# Patient Record
Sex: MALE | Race: OTHER | HISPANIC OR LATINO | URBAN - METROPOLITAN AREA
[De-identification: names, ages, dates, MRNs, and addresses within clinical notes are randomized per-mention and may not be internally consistent; named-entity substitution may affect disease eponyms.]

---

## 2018-10-26 ENCOUNTER — EMERGENCY (EMERGENCY)
Facility: HOSPITAL | Age: 48
LOS: 1 days | Discharge: OTHER INSTITUTION | End: 2018-10-26
Attending: EMERGENCY MEDICINE
Payer: COMMERCIAL

## 2018-10-26 VITALS — HEIGHT: 64 IN | WEIGHT: 190.04 LBS

## 2018-10-26 LAB
ALBUMIN SERPL ELPH-MCNC: 4 G/DL — SIGNIFICANT CHANGE UP (ref 3.3–5.2)
ALP SERPL-CCNC: 57 U/L — SIGNIFICANT CHANGE UP (ref 40–120)
ALT FLD-CCNC: 53 U/L — HIGH
ANION GAP SERPL CALC-SCNC: 12 MMOL/L — SIGNIFICANT CHANGE UP (ref 5–17)
APTT BLD: 24.9 SEC — LOW (ref 27.5–37.4)
AST SERPL-CCNC: 106 U/L — HIGH
BASOPHILS # BLD AUTO: 0 K/UL — SIGNIFICANT CHANGE UP (ref 0–0.2)
BASOPHILS NFR BLD AUTO: 0.2 % — SIGNIFICANT CHANGE UP (ref 0–2)
BILIRUB SERPL-MCNC: 0.9 MG/DL — SIGNIFICANT CHANGE UP (ref 0.4–2)
BUN SERPL-MCNC: 14 MG/DL — SIGNIFICANT CHANGE UP (ref 8–20)
CALCIUM SERPL-MCNC: 8.5 MG/DL — LOW (ref 8.6–10.2)
CHLORIDE SERPL-SCNC: 98 MMOL/L — SIGNIFICANT CHANGE UP (ref 98–107)
CO2 SERPL-SCNC: 28 MMOL/L — SIGNIFICANT CHANGE UP (ref 22–29)
CREAT SERPL-MCNC: 0.59 MG/DL — SIGNIFICANT CHANGE UP (ref 0.5–1.3)
EOSINOPHIL # BLD AUTO: 0.3 K/UL — SIGNIFICANT CHANGE UP (ref 0–0.5)
EOSINOPHIL NFR BLD AUTO: 6 % — HIGH (ref 0–5)
ETHANOL SERPL-MCNC: <10 MG/DL — SIGNIFICANT CHANGE UP
GLUCOSE SERPL-MCNC: 113 MG/DL — SIGNIFICANT CHANGE UP (ref 70–115)
HCT VFR BLD CALC: 39.5 % — LOW (ref 42–52)
HGB BLD-MCNC: 14.2 G/DL — SIGNIFICANT CHANGE UP (ref 14–18)
INR BLD: 1.12 RATIO — SIGNIFICANT CHANGE UP (ref 0.88–1.16)
LIDOCAIN IGE QN: 47 U/L — SIGNIFICANT CHANGE UP (ref 22–51)
LYMPHOCYTES # BLD AUTO: 1.2 K/UL — SIGNIFICANT CHANGE UP (ref 1–4.8)
LYMPHOCYTES # BLD AUTO: 21.9 % — SIGNIFICANT CHANGE UP (ref 20–55)
MAGNESIUM SERPL-MCNC: 2.2 MG/DL — SIGNIFICANT CHANGE UP (ref 1.6–2.6)
MCHC RBC-ENTMCNC: 30.7 PG — SIGNIFICANT CHANGE UP (ref 27–31)
MCHC RBC-ENTMCNC: 35.9 G/DL — SIGNIFICANT CHANGE UP (ref 32–36)
MCV RBC AUTO: 85.3 FL — SIGNIFICANT CHANGE UP (ref 80–94)
MONOCYTES # BLD AUTO: 0.4 K/UL — SIGNIFICANT CHANGE UP (ref 0–0.8)
MONOCYTES NFR BLD AUTO: 7.3 % — SIGNIFICANT CHANGE UP (ref 3–10)
NEUTROPHILS # BLD AUTO: 3.5 K/UL — SIGNIFICANT CHANGE UP (ref 1.8–8)
NEUTROPHILS NFR BLD AUTO: 64.4 % — SIGNIFICANT CHANGE UP (ref 37–73)
PLATELET # BLD AUTO: 106 K/UL — LOW (ref 150–400)
POTASSIUM SERPL-MCNC: 3.9 MMOL/L — SIGNIFICANT CHANGE UP (ref 3.5–5.3)
POTASSIUM SERPL-SCNC: 3.9 MMOL/L — SIGNIFICANT CHANGE UP (ref 3.5–5.3)
PROT SERPL-MCNC: 6.6 G/DL — SIGNIFICANT CHANGE UP (ref 6.6–8.7)
PROTHROM AB SERPL-ACNC: 12.3 SEC — SIGNIFICANT CHANGE UP (ref 9.8–12.7)
RBC # BLD: 4.63 M/UL — SIGNIFICANT CHANGE UP (ref 4.6–6.2)
RBC # FLD: 12.5 % — SIGNIFICANT CHANGE UP (ref 11–15.6)
SODIUM SERPL-SCNC: 138 MMOL/L — SIGNIFICANT CHANGE UP (ref 135–145)
T4 AB SER-ACNC: 8.4 UG/DL — SIGNIFICANT CHANGE UP (ref 4.5–12)
TROPONIN T SERPL-MCNC: <0.01 NG/ML — SIGNIFICANT CHANGE UP (ref 0–0.06)
TSH SERPL-MCNC: 6.17 UIU/ML — HIGH (ref 0.27–4.2)
WBC # BLD: 5.5 K/UL — SIGNIFICANT CHANGE UP (ref 4.8–10.8)
WBC # FLD AUTO: 5.5 K/UL — SIGNIFICANT CHANGE UP (ref 4.8–10.8)

## 2018-10-26 PROCEDURE — 99285 EMERGENCY DEPT VISIT HI MDM: CPT

## 2018-10-26 PROCEDURE — 93010 ELECTROCARDIOGRAM REPORT: CPT

## 2018-10-26 RX ORDER — SODIUM CHLORIDE 9 MG/ML
1000 INJECTION INTRAMUSCULAR; INTRAVENOUS; SUBCUTANEOUS
Qty: 0 | Refills: 0 | Status: DISCONTINUED | OUTPATIENT
Start: 2018-10-26 | End: 2018-10-31

## 2018-10-26 RX ORDER — FOLIC ACID 0.8 MG
1 TABLET ORAL ONCE
Qty: 0 | Refills: 0 | Status: COMPLETED | OUTPATIENT
Start: 2018-10-26 | End: 2018-10-26

## 2018-10-26 RX ORDER — THIAMINE MONONITRATE (VIT B1) 100 MG
100 TABLET ORAL ONCE
Qty: 0 | Refills: 0 | Status: COMPLETED | OUTPATIENT
Start: 2018-10-26 | End: 2018-10-26

## 2018-10-26 RX ORDER — METOCLOPRAMIDE HCL 10 MG
10 TABLET ORAL ONCE
Qty: 0 | Refills: 0 | Status: COMPLETED | OUTPATIENT
Start: 2018-10-26 | End: 2018-10-26

## 2018-10-26 RX ADMIN — SODIUM CHLORIDE 200 MILLILITER(S): 9 INJECTION INTRAMUSCULAR; INTRAVENOUS; SUBCUTANEOUS at 19:20

## 2018-10-26 RX ADMIN — Medication 1 MILLIGRAM(S): at 19:21

## 2018-10-26 RX ADMIN — Medication 10 MILLIGRAM(S): at 19:21

## 2018-10-26 RX ADMIN — Medication 30 MILLILITER(S): at 19:21

## 2018-10-26 RX ADMIN — Medication 100 MILLIGRAM(S): at 19:21

## 2018-10-26 NOTE — ED ADULT NURSE NOTE - CAS DISCH CONDITION
Pt nervous, just called his family. B/P slightly elevated. gait stead, ambulated to bathroom. Offers no complaints of pain at this time./Improved

## 2018-10-26 NOTE — ED PROVIDER NOTE - OBJECTIVE STATEMENT
47 y/o M pt with hx of anxiety, gastritis, and EtOH abuse presents to ED c/o hospitalize becky withfrawl before past seven days has been drinking daily last fdrink was this morning 1 beer prior sober for 7 years abd pain nausea vomiting dirahhea tremos vomiting is non blood no bilious 1 episode of cp at n oon atoday which resolveed spontaneoulsy no aleviating factors that he noticed no urinay sx no fevers no chills no SI no LI no visual or auditory hallucinations  tremors onset . Pt states that he vomited. NKDA  Pt denies fevers/chills, ha, loc, focal neuro deficits, cp/sob/palp, cough, abd pain/n/d, urinary symptoms, recent travel and sick contacts. No further complaints at this time. 47 y/o M pt with hx of anxiety, gastritis, and EtOH abuse presents to ED c/o tremors. He has been hospitalized for withdrawal before. The past seven days he has been drinking, with his last drink being 1 beer earlier this morning. He has been sober for the past seven years. Pt has abdominal pain, nausea, vomiting, diarrhea, and tremors. Vomiting does not contain blood nor bilious. Pt states he had 1 episode of CP at       noon today, which resolved spontaneously. There are no alleviating factors that he noticed. NKDA  Pt denies fevers/chills, ha, focal neuro deficits, sob/palp, cough, urinary symptoms, SI or HI, visual or auditory hallucinations, recent travel and sick contacts. No further complaints at this time.

## 2018-10-26 NOTE — ED ADULT NURSE NOTE - OBJECTIVE STATEMENT
pt states that he has been drinking everyday and takes clonazepam for anxiety prn pt states that he took his meds today and then felt tremulus. pt also c/o headache states that he only had 1 beer today

## 2018-10-26 NOTE — ED ADULT TRIAGE NOTE - CHIEF COMPLAINT QUOTE
c/o drinks everyday and now has abd. pain and feels shaky, last drink this morning c/o drinks everyday and now has abd. pain and feels shaky, last drink this morning, wants detox after he is discharged

## 2018-10-26 NOTE — ED STATDOCS - PROGRESS NOTE DETAILS
Patient is a 48 year old M with PMHx of gastritis and daily EtOH drinker who presents to the ED complaining of withdrawal symptoms.  States that he quit drinking and started drinking again, last drink this am.  Notes nausea, generalized abdominal pain, emesis prior to arrival. Focused eval, protocol orders entered. Pt to be moved to main ED for complete evaluation by another provider.  Exam: tongue appears dry, hands are not tremulous

## 2018-10-26 NOTE — ED PROVIDER NOTE - MEDICAL DECISION MAKING DETAILS
47 y/o M pt with hx of anxiety, gastritis, and EtOH abuse presents to ED c/o tremors. 47 y/o M pt with hx of anxiety, gastritis, and EtOH abuse presents to ED c/o tremors - labs within normal mild withdrawal at this time given librium - pt requesting BH eval - cleared medically for eval

## 2018-10-26 NOTE — ED ADULT NURSE NOTE - CHIEF COMPLAINT QUOTE
c/o drinks everyday and now has abd. pain and feels shaky, last drink this morning, wants detox after he is discharged

## 2018-10-26 NOTE — ED PROVIDER NOTE - PROGRESS NOTE DETAILS
Spoke with Dr. Motta from telepsych to marva as per psychiatry pt to be transferred to [facility]

## 2018-10-27 VITALS
SYSTOLIC BLOOD PRESSURE: 157 MMHG | HEART RATE: 66 BPM | TEMPERATURE: 98 F | OXYGEN SATURATION: 100 % | DIASTOLIC BLOOD PRESSURE: 98 MMHG | RESPIRATION RATE: 18 BRPM

## 2018-10-27 DIAGNOSIS — F10.20 ALCOHOL DEPENDENCE, UNCOMPLICATED: ICD-10-CM

## 2018-10-27 LAB
AMPHET UR-MCNC: NEGATIVE — SIGNIFICANT CHANGE UP
APPEARANCE UR: CLEAR — SIGNIFICANT CHANGE UP
BACTERIA # UR AUTO: NEGATIVE — SIGNIFICANT CHANGE UP
BARBITURATES UR SCN-MCNC: NEGATIVE — SIGNIFICANT CHANGE UP
BENZODIAZ UR-MCNC: NEGATIVE — SIGNIFICANT CHANGE UP
BILIRUB UR-MCNC: NEGATIVE — SIGNIFICANT CHANGE UP
COCAINE METAB.OTHER UR-MCNC: NEGATIVE — SIGNIFICANT CHANGE UP
COLOR SPEC: YELLOW — SIGNIFICANT CHANGE UP
DIFF PNL FLD: NEGATIVE — SIGNIFICANT CHANGE UP
EPI CELLS # UR: SIGNIFICANT CHANGE UP
GLUCOSE UR QL: NEGATIVE MG/DL — SIGNIFICANT CHANGE UP
KETONES UR-MCNC: NEGATIVE — SIGNIFICANT CHANGE UP
LEUKOCYTE ESTERASE UR-ACNC: NEGATIVE — SIGNIFICANT CHANGE UP
METHADONE UR-MCNC: NEGATIVE — SIGNIFICANT CHANGE UP
NITRITE UR-MCNC: NEGATIVE — SIGNIFICANT CHANGE UP
OPIATES UR-MCNC: NEGATIVE — SIGNIFICANT CHANGE UP
PCP SPEC-MCNC: SIGNIFICANT CHANGE UP
PCP UR-MCNC: NEGATIVE — SIGNIFICANT CHANGE UP
PH UR: 9 — HIGH (ref 5–8)
PROT UR-MCNC: NEGATIVE MG/DL — SIGNIFICANT CHANGE UP
RBC CASTS # UR COMP ASSIST: NEGATIVE /HPF — SIGNIFICANT CHANGE UP (ref 0–4)
SP GR SPEC: 1.01 — SIGNIFICANT CHANGE UP (ref 1.01–1.02)
THC UR QL: NEGATIVE — SIGNIFICANT CHANGE UP
UROBILINOGEN FLD QL: NEGATIVE MG/DL — SIGNIFICANT CHANGE UP
WBC UR QL: SIGNIFICANT CHANGE UP

## 2018-10-27 PROCEDURE — 84484 ASSAY OF TROPONIN QUANT: CPT

## 2018-10-27 PROCEDURE — 84436 ASSAY OF TOTAL THYROXINE: CPT

## 2018-10-27 PROCEDURE — 83690 ASSAY OF LIPASE: CPT

## 2018-10-27 PROCEDURE — T1013: CPT

## 2018-10-27 PROCEDURE — 80307 DRUG TEST PRSMV CHEM ANLYZR: CPT

## 2018-10-27 PROCEDURE — 96375 TX/PRO/DX INJ NEW DRUG ADDON: CPT

## 2018-10-27 PROCEDURE — 36415 COLL VENOUS BLD VENIPUNCTURE: CPT

## 2018-10-27 PROCEDURE — 96361 HYDRATE IV INFUSION ADD-ON: CPT

## 2018-10-27 PROCEDURE — 80053 COMPREHEN METABOLIC PANEL: CPT

## 2018-10-27 PROCEDURE — 90792 PSYCH DIAG EVAL W/MED SRVCS: CPT | Mod: GT

## 2018-10-27 PROCEDURE — 85730 THROMBOPLASTIN TIME PARTIAL: CPT

## 2018-10-27 PROCEDURE — 85610 PROTHROMBIN TIME: CPT

## 2018-10-27 PROCEDURE — 85027 COMPLETE CBC AUTOMATED: CPT

## 2018-10-27 PROCEDURE — 81001 URINALYSIS AUTO W/SCOPE: CPT

## 2018-10-27 PROCEDURE — 99285 EMERGENCY DEPT VISIT HI MDM: CPT | Mod: 25

## 2018-10-27 PROCEDURE — 84443 ASSAY THYROID STIM HORMONE: CPT

## 2018-10-27 PROCEDURE — 83735 ASSAY OF MAGNESIUM: CPT

## 2018-10-27 PROCEDURE — 96374 THER/PROPH/DIAG INJ IV PUSH: CPT

## 2018-10-27 PROCEDURE — 93005 ELECTROCARDIOGRAM TRACING: CPT

## 2018-10-27 RX ORDER — IBUPROFEN 200 MG
600 TABLET ORAL ONCE
Qty: 0 | Refills: 0 | Status: COMPLETED | OUTPATIENT
Start: 2018-10-27 | End: 2018-10-27

## 2018-10-27 RX ORDER — ONDANSETRON 8 MG/1
4 TABLET, FILM COATED ORAL ONCE
Qty: 0 | Refills: 0 | Status: COMPLETED | OUTPATIENT
Start: 2018-10-27 | End: 2018-10-27

## 2018-10-27 RX ORDER — ONDANSETRON 8 MG/1
4 TABLET, FILM COATED ORAL ONCE
Qty: 0 | Refills: 0 | Status: DISCONTINUED | OUTPATIENT
Start: 2018-10-27 | End: 2018-10-27

## 2018-10-27 RX ADMIN — Medication 600 MILLIGRAM(S): at 05:19

## 2018-10-27 RX ADMIN — Medication 600 MILLIGRAM(S): at 15:05

## 2018-10-27 RX ADMIN — Medication 600 MILLIGRAM(S): at 07:00

## 2018-10-27 RX ADMIN — Medication 50 MILLIGRAM(S): at 02:39

## 2018-10-27 RX ADMIN — Medication 600 MILLIGRAM(S): at 11:20

## 2018-10-27 RX ADMIN — SODIUM CHLORIDE 1000 MILLILITER(S): 9 INJECTION INTRAMUSCULAR; INTRAVENOUS; SUBCUTANEOUS at 02:30

## 2018-10-27 RX ADMIN — Medication 600 MILLIGRAM(S): at 16:43

## 2018-10-27 RX ADMIN — Medication 30 MILLILITER(S): at 13:30

## 2018-10-27 RX ADMIN — ONDANSETRON 4 MILLIGRAM(S): 8 TABLET, FILM COATED ORAL at 06:04

## 2018-10-27 NOTE — ED ADULT NURSE REASSESSMENT NOTE - NS ED NURSE REASSESS COMMENT FT1
pt assessed by tele-psych and tolerated well. pt states he takes klonopin prn for chronic intermittent "nerve pain" to left ear s/p a work related injury in which he incurred foreign material in the ear.

## 2018-10-27 NOTE — ED BEHAVIORAL HEALTH ASSESSMENT NOTE - SAFETY PLAN DETAILS
Pt understands that should he want further treatment of his anxiety and EtOH use, he can come back for referrals

## 2018-10-27 NOTE — ED BEHAVIORAL HEALTH NOTE - BEHAVIORAL HEALTH NOTE
RYAN NOTE:  SW spoke with pt with  #[id number] regarding detox. Pt is in agreement to go to Detox. SW explained and answered all questions regarding detox. Pt's sister in consult room speaking with RN; both SW and RN met with pt's sister and pt who explained what he wants to do. Pt's information faxed to [facility] in regards to an available detox bed. RYAN following

## 2018-10-27 NOTE — ED ADULT NURSE REASSESSMENT NOTE - NS ED NURSE REASSESS COMMENT FT1
received pt awake alert and oriented x4, pt medically cleared by ed attending Hernan, pt c/o nausea and vomiting prior to coming to the hospital, pt states he was "drinking beer and alcohol for four days, pt states "I want to get the alcohol out of my body" pt requesting etoh rehab services, pt states he last drank 36 hours ago and has been experiencing withdrawal symptoms. CIWA score in  of 7, pt denies any past or current psychiatric problems, denies any inpatient tx for etoh or psych in the past, pt denies SI, HI, AVH or paranoia.

## 2018-10-27 NOTE — ED BEHAVIORAL HEALTH ASSESSMENT NOTE - RISK ASSESSMENT
He is at chronically elevated risk of danger to himself given his relapse on EtOH, however his risk of imminent danger to himself is low based on his current mental status exam; does not require a more restrictive setting of care.

## 2018-10-27 NOTE — ED BEHAVIORAL HEALTH ASSESSMENT NOTE - HPI (INCLUDE ILLNESS QUALITY, SEVERITY, DURATION, TIMING, CONTEXT, MODIFYING FACTORS, ASSOCIATED SIGNS AND SYMPTOMS)
47 yo  M, Malay speaking only, employed as a ,  from his wife, non-caregiver with a history of anxiety, alcohol use disorder, no prior hospitalizations, no prior suicide attempts or self-injurious behaviors, no history of violence or arrests, active ETOH use and PMH of gastritis presented to the ED initially with a friend with complaints of physical discomfort and increased anxiety; in the context of relapsing back onto heavy EtOH use for the past week after a period of sobriety for 5 years. Pt is preoccupied with the nausea and abdominal discomfort. Pt reports that he has been anxious but denies being depressed. Denies SI / NSSI urges. Pt denies AVH / PI / IOR. No evidence of acute psychosis or steve on exam. Pt reports the primary problem is that he's drinking more, and mixing liquor with beer which has made him feel very sick. Pt states that his primary goal is to get alcohol out of his system so that he can feel better again. Reports that his last drink was on Wednesday.     Obtained collateral from his sister Tatiana: At baseline, patient is single, legally  from wife and living with sister for many years. He is employed as a  full time and in recovery from alcohol for 5 years.  Per sister, patient has appeared more tired than usual stating that his mood is “normal” without mood symptoms, anxiety, psychosis or thoughts to harm self/others. She has observed him drinking alcohol daily for about 1 week (exact amount unknown) without any other known drug use. She is unaware of any triggering events or stressors and has expressed concern for his relapse. Patient has had no prior hospitalizations and no prior suicide attempts or self-injurious behaviors. Per sister, he has never seen a therapist or psychiatrist and is not on any current medications. He has no known legal history or +hx of violence.  Sister denies acute safety concerns for suicide and feels patient would most benefit from detox treatment.

## 2018-10-27 NOTE — ED BEHAVIORAL HEALTH ASSESSMENT NOTE - SUMMARY
49 yo  M, Georgian speaking only, employed as a ,  from his wife, non-caregiver with a history of anxiety, alcohol use disorder, no prior hospitalizations, no prior suicide attempts or self-injurious behaviors, no history of violence or arrests, active ETOH use and PMH of gastritis presented to the ED initially with a friend with complaints of physical discomfort and increased anxiety; in the context of relapsing back onto heavy EtOH use for the past week after a period of sobriety for 5 years. Currently, he is preoccupied with physical discomfort, while appearing motivated to overcome his issues with EtOH use, denying any SI / NSSI urges or depression. Impressions of his overall presentation is due to relapsing on EtOH with some mild EtOH withdrawal. Denies needing any referrals for EtOH abuse, as he feels like he'll be able to stop drinking on his own. Discussed with patient possible interventions. Pt is agreeable to receiving medical interventions for his EtOH use and withdrawal. Denies any need for psychiatric treatment itself EtOH rehab. Pt will benefit from medical optimization of his current clinical situation with EtOH use and withdrawal. Pt is psychiatrically cleared.

## 2018-10-27 NOTE — ED BEHAVIORAL HEALTH ASSESSMENT NOTE - DESCRIPTION
Pt in ED for ~9 hrs. prior to Telepsych consult. Per nursing staff, patient presented to ED at 10/26/18 at 17:06 appropriately dressed and groomed with good hygiene.  He was accompanied by a friend who remained at bedside for a short while and is no longer present. Patient was compliant with triage process including gown changing, checking of belongings, labs and vitals. He presented with chief complaint of ETOH abuse and anxiety and has remained calm and cooperative throughout ED stay. He did not display any agitation or aggressive behaviors or require use of restraints/prn. Per nurse, patient is resting quietly and waiting for telepsych assessment. Mood is anxious and he denies SI/HI or AH/VH. Patient is alert and oriented x4. gastritis lives with his sister,

## 2018-10-27 NOTE — ED ADULT NURSE REASSESSMENT NOTE - CONDITION
improved/Pt asleep on initial rounds, V/S 136/79 P 65 R 20 T 98.1 Po2 98% Pt reports some nausea but improved, minor tremors felt. No diaphoresis. Pt reports heavy drinking last 4 days after 13 years sobriety. Denies hx of seizures. Up for breakfast. requested and given  decalf coffee.. Denies S/I, H/I or any psychosis.

## 2018-10-27 NOTE — ED ADULT NURSE REASSESSMENT NOTE - NS ED NURSE REASSESS COMMENT FT1
pt po challenged and tolerated intake well, pt ciwa 4, awaiting s/w for possible placement for etoh rehab.

## 2018-10-27 NOTE — ED BEHAVIORAL HEALTH NOTE - BEHAVIORAL HEALTH NOTE
RYANNotprimo: kobe Sandoval pt accepted for detox program at [facility]. Black: kobe Sandoval pt accepted for detox program at [facility] by dr Stahl. [facility] called (ROCAEL Morales within 60-90 min. Worker informed dr Ratliff,verbalized understanding. No other concerns reported at this moment.

## 2018-10-27 NOTE — ED BEHAVIORAL HEALTH ASSESSMENT NOTE - REFERRAL / APPOINTMENT DETAILS
Pt declined need for substance abuse referrals; just wants to feel better and have EtOH out of his system

## 2018-10-27 NOTE — ED BEHAVIORAL HEALTH NOTE - BEHAVIORAL HEALTH NOTE
RYAN Note: RYAN spoke to Lori at [facility] - detox bed available. Clinicals sent over and is currently being reviewed. SW to follow.

## 2020-09-08 NOTE — ED ADULT NURSE NOTE - CAS EDN INTEG ASSESS
Pharmacy faxed over a request for patient's Lisinopril for a 90 day supply it is more cost effective for patient.   Sending to provider for approval.
WDL

## 2022-03-16 ENCOUNTER — EMERGENCY (EMERGENCY)
Facility: HOSPITAL | Age: 52
LOS: 1 days | Discharge: HOME | End: 2022-03-16
Attending: EMERGENCY MEDICINE
Payer: COMMERCIAL

## 2022-03-16 VITALS
RESPIRATION RATE: 20 BRPM | DIASTOLIC BLOOD PRESSURE: 106 MMHG | HEIGHT: 64 IN | OXYGEN SATURATION: 99 % | TEMPERATURE: 98 F | SYSTOLIC BLOOD PRESSURE: 149 MMHG | WEIGHT: 181 LBS | HEART RATE: 81 BPM

## 2022-03-16 VITALS
DIASTOLIC BLOOD PRESSURE: 98 MMHG | SYSTOLIC BLOOD PRESSURE: 149 MMHG | HEART RATE: 85 BPM | RESPIRATION RATE: 18 BRPM | OXYGEN SATURATION: 99 % | TEMPERATURE: 99 F

## 2022-03-16 PROBLEM — F41.9 ANXIETY DISORDER, UNSPECIFIED: Chronic | Status: ACTIVE | Noted: 2018-10-26

## 2022-03-16 PROBLEM — F10.10 ALCOHOL ABUSE, UNCOMPLICATED: Chronic | Status: ACTIVE | Noted: 2018-10-26

## 2022-03-16 LAB
ALBUMIN SERPL ELPH-MCNC: 4.3 G/DL — SIGNIFICANT CHANGE UP (ref 3.3–5.2)
ALP SERPL-CCNC: 66 U/L — SIGNIFICANT CHANGE UP (ref 40–120)
ALT FLD-CCNC: 56 U/L — HIGH
ANION GAP SERPL CALC-SCNC: 14 MMOL/L — SIGNIFICANT CHANGE UP (ref 5–17)
AST SERPL-CCNC: 45 U/L — HIGH
BASOPHILS # BLD AUTO: 0.04 K/UL — SIGNIFICANT CHANGE UP (ref 0–0.2)
BASOPHILS NFR BLD AUTO: 0.6 % — SIGNIFICANT CHANGE UP (ref 0–2)
BILIRUB SERPL-MCNC: 0.7 MG/DL — SIGNIFICANT CHANGE UP (ref 0.4–2)
BUN SERPL-MCNC: 15.1 MG/DL — SIGNIFICANT CHANGE UP (ref 8–20)
CALCIUM SERPL-MCNC: 8.7 MG/DL — SIGNIFICANT CHANGE UP (ref 8.6–10.2)
CHLORIDE SERPL-SCNC: 96 MMOL/L — LOW (ref 98–107)
CO2 SERPL-SCNC: 25 MMOL/L — SIGNIFICANT CHANGE UP (ref 22–29)
CREAT SERPL-MCNC: 0.86 MG/DL — SIGNIFICANT CHANGE UP (ref 0.5–1.3)
EGFR: 105 ML/MIN/1.73M2 — SIGNIFICANT CHANGE UP
EOSINOPHIL # BLD AUTO: 0.19 K/UL — SIGNIFICANT CHANGE UP (ref 0–0.5)
EOSINOPHIL NFR BLD AUTO: 2.9 % — SIGNIFICANT CHANGE UP (ref 0–6)
GLUCOSE SERPL-MCNC: 115 MG/DL — HIGH (ref 70–99)
HCT VFR BLD CALC: 43.2 % — SIGNIFICANT CHANGE UP (ref 39–50)
HGB BLD-MCNC: 15.6 G/DL — SIGNIFICANT CHANGE UP (ref 13–17)
IMM GRANULOCYTES NFR BLD AUTO: 0.6 % — SIGNIFICANT CHANGE UP (ref 0–1.5)
LIDOCAIN IGE QN: 27 U/L — SIGNIFICANT CHANGE UP (ref 22–51)
LYMPHOCYTES # BLD AUTO: 0.84 K/UL — LOW (ref 1–3.3)
LYMPHOCYTES # BLD AUTO: 12.9 % — LOW (ref 13–44)
MCHC RBC-ENTMCNC: 30.5 PG — SIGNIFICANT CHANGE UP (ref 27–34)
MCHC RBC-ENTMCNC: 36.1 GM/DL — HIGH (ref 32–36)
MCV RBC AUTO: 84.5 FL — SIGNIFICANT CHANGE UP (ref 80–100)
MONOCYTES # BLD AUTO: 0.48 K/UL — SIGNIFICANT CHANGE UP (ref 0–0.9)
MONOCYTES NFR BLD AUTO: 7.4 % — SIGNIFICANT CHANGE UP (ref 2–14)
NEUTROPHILS # BLD AUTO: 4.94 K/UL — SIGNIFICANT CHANGE UP (ref 1.8–7.4)
NEUTROPHILS NFR BLD AUTO: 75.6 % — SIGNIFICANT CHANGE UP (ref 43–77)
PLATELET # BLD AUTO: 150 K/UL — SIGNIFICANT CHANGE UP (ref 150–400)
POTASSIUM SERPL-MCNC: 3.9 MMOL/L — SIGNIFICANT CHANGE UP (ref 3.5–5.3)
POTASSIUM SERPL-SCNC: 3.9 MMOL/L — SIGNIFICANT CHANGE UP (ref 3.5–5.3)
PROT SERPL-MCNC: 7 G/DL — SIGNIFICANT CHANGE UP (ref 6.6–8.7)
RBC # BLD: 5.11 M/UL — SIGNIFICANT CHANGE UP (ref 4.2–5.8)
RBC # FLD: 11.7 % — SIGNIFICANT CHANGE UP (ref 10.3–14.5)
SODIUM SERPL-SCNC: 135 MMOL/L — SIGNIFICANT CHANGE UP (ref 135–145)
WBC # BLD: 6.53 K/UL — SIGNIFICANT CHANGE UP (ref 3.8–10.5)
WBC # FLD AUTO: 6.53 K/UL — SIGNIFICANT CHANGE UP (ref 3.8–10.5)

## 2022-03-16 PROCEDURE — 84484 ASSAY OF TROPONIN QUANT: CPT

## 2022-03-16 PROCEDURE — 96374 THER/PROPH/DIAG INJ IV PUSH: CPT

## 2022-03-16 PROCEDURE — 99284 EMERGENCY DEPT VISIT MOD MDM: CPT | Mod: 25

## 2022-03-16 PROCEDURE — 80053 COMPREHEN METABOLIC PANEL: CPT

## 2022-03-16 PROCEDURE — 99284 EMERGENCY DEPT VISIT MOD MDM: CPT

## 2022-03-16 PROCEDURE — 93005 ELECTROCARDIOGRAM TRACING: CPT

## 2022-03-16 PROCEDURE — 93010 ELECTROCARDIOGRAM REPORT: CPT

## 2022-03-16 PROCEDURE — 36415 COLL VENOUS BLD VENIPUNCTURE: CPT

## 2022-03-16 PROCEDURE — 96375 TX/PRO/DX INJ NEW DRUG ADDON: CPT

## 2022-03-16 PROCEDURE — 85025 COMPLETE CBC W/AUTO DIFF WBC: CPT

## 2022-03-16 PROCEDURE — 83690 ASSAY OF LIPASE: CPT

## 2022-03-16 RX ORDER — ALPRAZOLAM 0.25 MG
0.5 TABLET ORAL ONCE
Refills: 0 | Status: DISCONTINUED | OUTPATIENT
Start: 2022-03-16 | End: 2022-03-16

## 2022-03-16 RX ORDER — CLONAZEPAM 1 MG
1 TABLET ORAL
Qty: 14 | Refills: 0
Start: 2022-03-16 | End: 2022-03-22

## 2022-03-16 RX ORDER — FAMOTIDINE 10 MG/ML
20 INJECTION INTRAVENOUS ONCE
Refills: 0 | Status: COMPLETED | OUTPATIENT
Start: 2022-03-16 | End: 2022-03-16

## 2022-03-16 RX ORDER — ACETAMINOPHEN 500 MG
650 TABLET ORAL ONCE
Refills: 0 | Status: COMPLETED | OUTPATIENT
Start: 2022-03-16 | End: 2022-03-16

## 2022-03-16 RX ORDER — DIPHENHYDRAMINE HCL 50 MG
25 CAPSULE ORAL ONCE
Refills: 0 | Status: COMPLETED | OUTPATIENT
Start: 2022-03-16 | End: 2022-03-16

## 2022-03-16 RX ORDER — SODIUM CHLORIDE 9 MG/ML
1000 INJECTION INTRAMUSCULAR; INTRAVENOUS; SUBCUTANEOUS ONCE
Refills: 0 | Status: COMPLETED | OUTPATIENT
Start: 2022-03-16 | End: 2022-03-16

## 2022-03-16 RX ORDER — ONDANSETRON 8 MG/1
4 TABLET, FILM COATED ORAL ONCE
Refills: 0 | Status: COMPLETED | OUTPATIENT
Start: 2022-03-16 | End: 2022-03-16

## 2022-03-16 RX ADMIN — Medication 30 MILLILITER(S): at 10:51

## 2022-03-16 RX ADMIN — Medication 650 MILLIGRAM(S): at 10:51

## 2022-03-16 RX ADMIN — Medication 25 MILLIGRAM(S): at 08:48

## 2022-03-16 RX ADMIN — Medication 0.5 MILLIGRAM(S): at 10:51

## 2022-03-16 RX ADMIN — FAMOTIDINE 20 MILLIGRAM(S): 10 INJECTION INTRAVENOUS at 08:55

## 2022-03-16 RX ADMIN — ONDANSETRON 4 MILLIGRAM(S): 8 TABLET, FILM COATED ORAL at 08:47

## 2022-03-16 RX ADMIN — SODIUM CHLORIDE 1000 MILLILITER(S): 9 INJECTION INTRAMUSCULAR; INTRAVENOUS; SUBCUTANEOUS at 08:48

## 2022-03-16 NOTE — ED PROVIDER NOTE - PROGRESS NOTE DETAILS
Pt. re-evaluated. Pt. states that he abdominal pain is less. Abdomen soft/NT.  labs discuss with patient and his son. Pt. also c/o feeling anxious and states that he us to take medication for his anxious.  Pt. will be given PO challenge. Pt. tolerated PO. Abdomen is soft/NT with no guarding. NO vomiting. Pt. stable for discharge.

## 2022-03-16 NOTE — ED ADULT TRIAGE NOTE - BMI (KG/M2)
Include Location In Plan?: Yes Detail Level: Generalized Hide Include Location In Plan Question?: No Detail Level: Zone 31.1

## 2022-03-16 NOTE — ED PROVIDER NOTE - OBJECTIVE STATEMENT
pt c/o abd pain ,rash on left side of back  pt has been sober for 5 years , this Saturday pt went back drinking , he has been drinking for the past 3 days, pt denies vomiting but is nauseous, pt denies any medical hx, pt  with hx of Gastritis present to the ED c/o diffuse upper abdominal pain for the past 2 days. Pt. has been drinking alcohol daily for the past 4 days. Last drink was last night. Pt. had stopped drinking alcohol 5 years ago prior to this event. Pt. also c/o feeling noxious but no vomiting. Pt. states that he has a "burning" sensation all over his body. No fever. No diarrhea. Pt's son also noticed diffuse rash all over his body(chest/back/abdomen/arms/legs) yesterday. Pt. denies any pruritis but some skin excoriation is noted. pt  with hx of Gastritis present to the ED c/o diffuse upper abdominal pain for the past 2 days. Pt. has been drinking alcohol daily for the past 4 days. Last drink was last night. Pt. had stopped drinking alcohol 5 years ago prior to this event. Pt. also c/o feeling noxious but no vomiting. Pt. states that he has a "burning" sensation all over his body. No fever. No diarrhea. Pt's son also noticed diffuse rash all over his body(chest/back/abdomen/arms/legs) yesterday. Pt. denies any pruritis but some skin excoriation is noted. Pt. briefly mentioned some headache and some chest pain while discussing his abdominal pain.

## 2022-03-16 NOTE — ED PROVIDER NOTE - PATIENT PORTAL LINK FT
You can access the FollowMyHealth Patient Portal offered by [facility] by registering at the following website: [email or web address]. By joining [employer]’s FollowMyHealth portal, you will also be able to view your health information using other applications (apps) compatible with our system.

## 2022-03-16 NOTE — ED PROVIDER NOTE - CLINICAL SUMMARY MEDICAL DECISION MAKING FREE TEXT BOX
Pt. with upper abdominal pain x2 days. Pt. has been drinking alcohol for the past 4 days. Pt. also has diffuse rash to his body. No fever. Pt. with upper abdominal pain x2 days. Pt. has been drinking alcohol for the past 4 days. Pt. did mention some chest pain, although no active chest pain at this time. Pt. also has diffuse rash to his body. No fever. Will check labs/EKG/give IV fluids and treat pt's symptoms. Possible alcohol gastritis.

## 2022-03-16 NOTE — ED ADULT NURSE NOTE - CHPI ED NUR SYMPTOMS NEG
no back pain/no chest pain/no chills/no congestion/no diaphoresis/no dizziness/no fever/no nausea/no shortness of breath/no syncope/no vomiting no abdominal distension/no chills/no diarrhea/no dysuria/no fever/no hematuria/no nausea/no vomiting

## 2022-03-16 NOTE — ED ADULT NURSE NOTE - OBJECTIVE STATEMENT
Patient comes in complaining of chest heaviness since 9:00PM last night. Patient states she doesn't feel chest pain, just heaviness. Patient states that first she felt an increase in gas in her stomach which eventually turned to chest heaviness. Patient states she last had vegetables for dinner. Patient denies any nausea, vomiting, diahhrea. Patient also denies any swelling or SOB. Patient states she has a Cardiac history. Patient accompanied by daughter son. Currently showing 61 afib on monitor. Patient comes in complaining of abdominal pain x 3 days. Patient states he was sober for 5 years and relapsed on 3/13. Patient states he has been binge drinking since 3/13, but can not remember how much he had or what he had to drink. Patient complains of upper bilateral abdominal pain, 10/10. Patient also endorses spitting up, but no vomiting, and headache since this morning. Upper abdominal quadrants noted to be firm.

## 2022-03-16 NOTE — ED ADULT TRIAGE NOTE - CHIEF COMPLAINT QUOTE
pt c/o abd pain ,rash on left side of back  pt has been sober for 5 years , this Saturday pt went back drinking , he has been drinking for the past 3 days, pt denies vomiting but is nauseous, pt denies any medical hx,

## 2022-03-16 NOTE — ED PROVIDER NOTE - NSFOLLOWUPINSTRUCTIONS_ED_ALL_ED_FT
STOP drinking alcohol. Follow up with your doctor. Take your anxiety medication as needed. Return to ER if worsening abdominal pain or vomiting.

## 2022-12-12 NOTE — ED ADULT NURSE NOTE - CHIEF COMPLAINT QUOTE
Yes agree with hold and pt should have Lovenox bridging    pt c/o abd pain ,rash on left side of back  pt has been sober for 5 years , this Saturday pt went back drinking , he has been drinking for the past 3 days, pt denies vomiting but is nauseous, pt denies any medical hx,

## 2024-11-23 NOTE — ED BEHAVIORAL HEALTH ASSESSMENT NOTE - NS ED BHA HEROIN OPIATES
Your chest pain is likely from the muscles and joints surrounding your breastbone on the left. Try taking Tylenol 3 times per day (no more than 3,000 mg in one day), ice packs or heating pad.  Follow up with your PCP if the pain does not get better.  We will contact Dr Monteiro's office to set up a follow up.          None known

## 2025-04-23 ENCOUNTER — EMERGENCY (EMERGENCY)
Facility: HOSPITAL | Age: 55
LOS: 1 days | End: 2025-04-23
Attending: EMERGENCY MEDICINE
Payer: COMMERCIAL

## 2025-04-23 VITALS
TEMPERATURE: 98 F | HEART RATE: 102 BPM | SYSTOLIC BLOOD PRESSURE: 158 MMHG | OXYGEN SATURATION: 96 % | DIASTOLIC BLOOD PRESSURE: 77 MMHG | RESPIRATION RATE: 17 BRPM

## 2025-04-23 VITALS
HEART RATE: 106 BPM | SYSTOLIC BLOOD PRESSURE: 138 MMHG | DIASTOLIC BLOOD PRESSURE: 82 MMHG | TEMPERATURE: 98 F | RESPIRATION RATE: 16 BRPM | OXYGEN SATURATION: 97 %

## 2025-04-23 LAB
ALBUMIN SERPL ELPH-MCNC: 4.5 G/DL — SIGNIFICANT CHANGE UP (ref 3.3–5.2)
ALP SERPL-CCNC: 63 U/L — SIGNIFICANT CHANGE UP (ref 40–120)
ALT FLD-CCNC: 39 U/L — SIGNIFICANT CHANGE UP
ANION GAP SERPL CALC-SCNC: 16 MMOL/L — SIGNIFICANT CHANGE UP (ref 5–17)
AST SERPL-CCNC: 39 U/L — SIGNIFICANT CHANGE UP
BASOPHILS # BLD AUTO: 0.04 K/UL — SIGNIFICANT CHANGE UP (ref 0–0.2)
BASOPHILS NFR BLD AUTO: 0.6 % — SIGNIFICANT CHANGE UP (ref 0–2)
BILIRUB SERPL-MCNC: 0.3 MG/DL — LOW (ref 0.4–2)
BUN SERPL-MCNC: 11.8 MG/DL — SIGNIFICANT CHANGE UP (ref 8–20)
CALCIUM SERPL-MCNC: 8.9 MG/DL — SIGNIFICANT CHANGE UP (ref 8.4–10.5)
CHLORIDE SERPL-SCNC: 96 MMOL/L — SIGNIFICANT CHANGE UP (ref 96–108)
CO2 SERPL-SCNC: 27 MMOL/L — SIGNIFICANT CHANGE UP (ref 22–29)
CREAT SERPL-MCNC: 0.88 MG/DL — SIGNIFICANT CHANGE UP (ref 0.5–1.3)
EGFR: 102 ML/MIN/1.73M2 — SIGNIFICANT CHANGE UP
EGFR: 102 ML/MIN/1.73M2 — SIGNIFICANT CHANGE UP
EOSINOPHIL # BLD AUTO: 0.43 K/UL — SIGNIFICANT CHANGE UP (ref 0–0.5)
EOSINOPHIL NFR BLD AUTO: 6.7 % — HIGH (ref 0–6)
ETHANOL SERPL-MCNC: 233 MG/DL — HIGH (ref 0–9)
FLUAV AG NPH QL: SIGNIFICANT CHANGE UP
FLUBV AG NPH QL: SIGNIFICANT CHANGE UP
GLUCOSE SERPL-MCNC: 132 MG/DL — HIGH (ref 70–99)
HCT VFR BLD CALC: 43.8 % — SIGNIFICANT CHANGE UP (ref 39–50)
HGB BLD-MCNC: 15.9 G/DL — SIGNIFICANT CHANGE UP (ref 13–17)
IMM GRANULOCYTES # BLD AUTO: 0.03 K/UL — SIGNIFICANT CHANGE UP (ref 0–0.07)
IMM GRANULOCYTES NFR BLD AUTO: 0.5 % — SIGNIFICANT CHANGE UP (ref 0–0.9)
LYMPHOCYTES # BLD AUTO: 2.52 K/UL — SIGNIFICANT CHANGE UP (ref 1–3.3)
LYMPHOCYTES NFR BLD AUTO: 39 % — SIGNIFICANT CHANGE UP (ref 13–44)
MCHC RBC-ENTMCNC: 30.9 PG — SIGNIFICANT CHANGE UP (ref 27–34)
MCHC RBC-ENTMCNC: 36.3 G/DL — HIGH (ref 32–36)
MCV RBC AUTO: 85.2 FL — SIGNIFICANT CHANGE UP (ref 80–100)
MONOCYTES # BLD AUTO: 0.54 K/UL — SIGNIFICANT CHANGE UP (ref 0–0.9)
MONOCYTES NFR BLD AUTO: 8.4 % — SIGNIFICANT CHANGE UP (ref 2–14)
NEUTROPHILS # BLD AUTO: 2.9 K/UL — SIGNIFICANT CHANGE UP (ref 1.8–7.4)
NEUTROPHILS NFR BLD AUTO: 44.8 % — SIGNIFICANT CHANGE UP (ref 43–77)
NRBC # BLD AUTO: 0 K/UL — SIGNIFICANT CHANGE UP (ref 0–0)
NRBC # FLD: 0 K/UL — SIGNIFICANT CHANGE UP (ref 0–0)
NRBC BLD AUTO-RTO: 0 /100 WBCS — SIGNIFICANT CHANGE UP (ref 0–0)
PLATELET # BLD AUTO: 172 K/UL — SIGNIFICANT CHANGE UP (ref 150–400)
PMV BLD: 10 FL — SIGNIFICANT CHANGE UP (ref 7–13)
POTASSIUM SERPL-MCNC: 3.7 MMOL/L — SIGNIFICANT CHANGE UP (ref 3.5–5.3)
POTASSIUM SERPL-SCNC: 3.7 MMOL/L — SIGNIFICANT CHANGE UP (ref 3.5–5.3)
PROT SERPL-MCNC: 7.4 G/DL — SIGNIFICANT CHANGE UP (ref 6.6–8.7)
RBC # BLD: 5.14 M/UL — SIGNIFICANT CHANGE UP (ref 4.2–5.8)
RBC # FLD: 11.7 % — SIGNIFICANT CHANGE UP (ref 10.3–14.5)
RSV RNA NPH QL NAA+NON-PROBE: SIGNIFICANT CHANGE UP
SARS-COV-2 RNA SPEC QL NAA+PROBE: SIGNIFICANT CHANGE UP
SODIUM SERPL-SCNC: 139 MMOL/L — SIGNIFICANT CHANGE UP (ref 135–145)
SOURCE RESPIRATORY: SIGNIFICANT CHANGE UP
WBC # BLD: 6.46 K/UL — SIGNIFICANT CHANGE UP (ref 3.8–10.5)
WBC # FLD AUTO: 6.46 K/UL — SIGNIFICANT CHANGE UP (ref 3.8–10.5)

## 2025-04-23 PROCEDURE — 99285 EMERGENCY DEPT VISIT HI MDM: CPT | Mod: 25

## 2025-04-23 PROCEDURE — 0241U: CPT

## 2025-04-23 PROCEDURE — 93005 ELECTROCARDIOGRAM TRACING: CPT

## 2025-04-23 PROCEDURE — 87637 SARSCOV2&INF A&B&RSV AMP PRB: CPT

## 2025-04-23 PROCEDURE — 99284 EMERGENCY DEPT VISIT MOD MDM: CPT

## 2025-04-23 PROCEDURE — 85025 COMPLETE CBC W/AUTO DIFF WBC: CPT

## 2025-04-23 PROCEDURE — 80307 DRUG TEST PRSMV CHEM ANLYZR: CPT

## 2025-04-23 PROCEDURE — 80053 COMPREHEN METABOLIC PANEL: CPT

## 2025-04-23 PROCEDURE — 36415 COLL VENOUS BLD VENIPUNCTURE: CPT

## 2025-04-23 PROCEDURE — 93010 ELECTROCARDIOGRAM REPORT: CPT

## 2025-04-23 PROCEDURE — T1013: CPT

## 2025-04-23 RX ORDER — CLONAZEPAM 0.5 MG/1
0.5 TABLET ORAL ONCE
Refills: 0 | Status: DISCONTINUED | OUTPATIENT
Start: 2025-04-23 | End: 2025-04-23

## 2025-04-23 RX ORDER — CLONAZEPAM 0.5 MG/1
1 TABLET ORAL
Qty: 6 | Refills: 0
Start: 2025-04-23 | End: 2025-04-25

## 2025-04-23 RX ADMIN — CLONAZEPAM 0.5 MILLIGRAM(S): 0.5 TABLET ORAL at 19:30

## 2025-04-23 NOTE — ED ADULT NURSE NOTE - NSFALLUNIVINTERV_ED_ALL_ED
Bed/Stretcher in lowest position, wheels locked, appropriate side rails in place/Call bell, personal items and telephone in reach/Instruct patient to call for assistance before getting out of bed/chair/stretcher/Non-slip footwear applied when patient is off stretcher/[place] to call system/Physically safe environment - no spills, clutter or unnecessary equipment/Purposeful proactive rounding/Room/bathroom lighting operational, light cord in reach

## 2025-04-23 NOTE — ED PROVIDER NOTE - CROS ED CARDIOVAS ALL NEG
Let's treat her with Paxlovid. Symptoms can overlap with COVID. Follow up if nto improving.   
Pt positive for covid. Having chest symptoms. Do you want her to get evaluated in urgent care? Or offer Paxlovid?  Glomerular Filtration Rate (no units)   Date Value   12/27/2024 >90      
negative...
Home

## 2025-04-23 NOTE — ED PROVIDER NOTE - OBJECTIVE STATEMENT
54-year-old male with history of episodic alcohol use presents to the ED with nephew who initially was requesting help with his alcohol problem.  Patient states he was clean and sober for approximately last 3 to 4 years and then after celebrating his son's birthday he started to drink this past week.  Patient states he is consuming 6-8 large cans of beer as well as 1 pint of Donn Francois.  Patient states he is normally prescribed Klonopin 0.5 mg by his PCP Dr. Gamez   And takes it for when he becomes anxious.  Patient denies any other complaints at this time

## 2025-04-23 NOTE — ED PROVIDER NOTE - NSFOLLOWUPINSTRUCTIONS_ED_ALL_ED_FT
Klonopin 0.5 mg twice daily as needed for anxiety   follow-up with detox as an outpatient   follow-up with Dr. Gamez next 1 to 2 days   return sooner for any problems        Alcohol Abuse    Alcohol intoxication occurs when the amount of alcohol that a person has consumed impairs his or her ability to mentally and physically function. Chronic alcohol consumption can also lead to a variety of health issues including neurological disease, stomach disease, heart disease, liver disease, etc. Do not drive after drinking alcohol. Drinking enough alcohol to end up in an Emergency Room suggests you may have an alcohol abuse problem. Seek help at a drug addiction center.    SEEK IMMEDIATE MEDICAL CARE IF YOU HAVE ANY OF THE FOLLOWING SYMPTOMS: seizures, vomiting blood, blood in your stool, lightheadedness/dizziness, or becoming shaky to tremulous when you stop drinking.

## 2025-04-23 NOTE — ED ADULT NURSE NOTE - IS THE PATIENT ABLE TO BE SCREENED?
no No Benzoyl Peroxide Counseling: Patient counseled that medicine may cause skin irritation and bleach clothing.  In the event of skin irritation, the patient was advised to reduce the amount of the drug applied or use it less frequently.   The patient verbalized understanding of the proper use and possible adverse effects of benzoyl peroxide.  All of the patient's questions and concerns were addressed.

## 2025-04-23 NOTE — ED ADULT TRIAGE NOTE - CHIEF COMPLAINT QUOTE
arrive to ED req help to stop drinking alcohol. pt admits to alcohol intake since saturday, last use today. nephew at bedside.

## 2025-04-23 NOTE — ED PROVIDER NOTE - CLINICAL SUMMARY MEDICAL DECISION MAKING FREE TEXT BOX
54-year-old male with history of episodic alcohol use presents to the ED with nephew who initially was requesting help with his alcohol problem.  Patient states he was clean and sober for approximately last 3 to 4 years and then after celebrating his son's birthday he started to drink this past week.  Patient states he is consuming 6-8 large cans of beer as well as 1 pint of Donn Francois.  Patient states he is normally prescribed Klonopin 0.5 mg by his PCP Dr. Gamez   And takes it for when he becomes anxious.  Patient denies any other complaints at this time. will check labs alcohol level and reevaluate

## 2025-04-23 NOTE — ED ADULT NURSE NOTE - OBJECTIVE STATEMENT
Pt presents to the  ED to help to stop drinking alcohol. pt admits to alcohol intake since Saturday, last use today. Received Pt in yellow gown. belongings secured in shed.

## 2025-04-23 NOTE — ED PROVIDER NOTE - PATIENT PORTAL LINK FT
You can access the FollowMyHealth Patient Portal offered by [facility] by registering at the following website: [email or web address]. By joining [email or web address]’s FollowMyHealth portal, you will also be able to view your health information using other applications (apps) compatible with our system. (3) adequate

## 2025-04-23 NOTE — ED PROVIDER NOTE - PROGRESS NOTE DETAILS
Labs as noted.  Patient not interested in detox at this time and states he will be able to stop on his own.  Patient requesting few tablets of Klonopin as he ran out and receives them normally from Dr. Gamez his PCP.  Patient ambulating in ED with steady gait with no obvious signs of withdrawal at this time.  Patient requesting discharge